# Patient Record
Sex: FEMALE | Race: WHITE | Employment: FULL TIME | URBAN - METROPOLITAN AREA
[De-identification: names, ages, dates, MRNs, and addresses within clinical notes are randomized per-mention and may not be internally consistent; named-entity substitution may affect disease eponyms.]

---

## 2024-08-21 ENCOUNTER — OFFICE VISIT (OUTPATIENT)
Dept: FAMILY MEDICINE | Facility: CLINIC | Age: 24
End: 2024-08-21
Payer: COMMERCIAL

## 2024-08-21 VITALS
TEMPERATURE: 100 F | OXYGEN SATURATION: 98 % | BODY MASS INDEX: 40.57 KG/M2 | DIASTOLIC BLOOD PRESSURE: 76 MMHG | SYSTOLIC BLOOD PRESSURE: 128 MMHG | HEIGHT: 67 IN | WEIGHT: 258.5 LBS | HEART RATE: 100 BPM

## 2024-08-21 DIAGNOSIS — J02.9 VIRAL PHARYNGITIS: Primary | ICD-10-CM

## 2024-08-21 DIAGNOSIS — J02.9 SORE THROAT: ICD-10-CM

## 2024-08-21 LAB
CTP QC/QA: YES
CTP QC/QA: YES
MOLECULAR STREP A: NEGATIVE
SARS-COV-2 RDRP RESP QL NAA+PROBE: NEGATIVE

## 2024-08-21 PROCEDURE — 87651 STREP A DNA AMP PROBE: CPT | Mod: QW,S$GLB,, | Performed by: STUDENT IN AN ORGANIZED HEALTH CARE EDUCATION/TRAINING PROGRAM

## 2024-08-21 PROCEDURE — 3074F SYST BP LT 130 MM HG: CPT | Mod: CPTII,S$GLB,, | Performed by: STUDENT IN AN ORGANIZED HEALTH CARE EDUCATION/TRAINING PROGRAM

## 2024-08-21 PROCEDURE — 87635 SARS-COV-2 COVID-19 AMP PRB: CPT | Mod: QW,S$GLB,, | Performed by: STUDENT IN AN ORGANIZED HEALTH CARE EDUCATION/TRAINING PROGRAM

## 2024-08-21 PROCEDURE — 99999 PR PBB SHADOW E&M-NEW PATIENT-LVL III: CPT | Mod: PBBFAC,,, | Performed by: STUDENT IN AN ORGANIZED HEALTH CARE EDUCATION/TRAINING PROGRAM

## 2024-08-21 PROCEDURE — 1159F MED LIST DOCD IN RCRD: CPT | Mod: CPTII,S$GLB,, | Performed by: STUDENT IN AN ORGANIZED HEALTH CARE EDUCATION/TRAINING PROGRAM

## 2024-08-21 PROCEDURE — 99213 OFFICE O/P EST LOW 20 MIN: CPT | Mod: S$GLB,,, | Performed by: STUDENT IN AN ORGANIZED HEALTH CARE EDUCATION/TRAINING PROGRAM

## 2024-08-21 PROCEDURE — 3008F BODY MASS INDEX DOCD: CPT | Mod: CPTII,S$GLB,, | Performed by: STUDENT IN AN ORGANIZED HEALTH CARE EDUCATION/TRAINING PROGRAM

## 2024-08-21 PROCEDURE — 3078F DIAST BP <80 MM HG: CPT | Mod: CPTII,S$GLB,, | Performed by: STUDENT IN AN ORGANIZED HEALTH CARE EDUCATION/TRAINING PROGRAM

## 2024-08-21 NOTE — PROGRESS NOTES
"Patient ID: Vy Uriostegui is a 24 y.o. female.    Chief Complaint: Sore Throat (Sore throat started yesterday)    History of Present Illness    CHIEF COMPLAINT:  Ms. Uriostegui presents today with sore throat.    HISTORY OF PRESENT ILLNESS:  She reports sore throat onset last night, initially significantly red but now improving. She experienced a mild fever of 99.5°F. She denies shortness of breath, wheezing, or history of recurrent tonsillitis, stating she rarely gets sick. She reports negative results for both COVID-19 and strep tests. She has not attempted any treatments, expressing a preference to avoid medication.    PHYSICAL EXAM:  Mild swelling and redness of the throat were observed, with possible presence of exudate and white dots.    ROS:  Constitutional: +fevers  ENT: +sore throat  Respiratory: -shortness of breath         Pmh, Psh, Family Hx, Social Hx updated in Epic Tabs today.     Physical exam    General - Well developed, alert and oriented in NAD  HEENT - normocephalic, no evidence of trauma, sclera white, EOMI  Neck - full range of motion  Throat exam normal. Oral cavity, tongue,  palate have no inflammation or suspicious lesions. Pharyngeal erythema +Tonsils - tonsils are present and normal. Teeth normal without tenderness.           Assessment:     1. Viral pharyngitis    2. Sore throat        LABS:   No results found for: "HGBA1C"   No results found for: "CHOL"  No results found for: "LDLCALC"  No results found for: "WBC", "HGB", "HCT", "PLT", "CHOL", "TRIG", "HDL", "LDLDIRECT", "ALT", "AST", "NA", "K", "CL", "CREATININE", "BUN", "CO2", "TSH", "PSA", "INR", "GLUF", "HGBA1C", "MICROALBUR"    Plan:   Vy was seen today for sore throat.    Diagnoses and all orders for this visit:    Viral pharyngitis    Sore throat  -     POCT COVID-19 Rapid Screening  -     POCT Strep A, Molecular        Assessment & Plan    J02.9 Acute pharyngitis, unspecified  J02.0 Streptococcal pharyngitis  VIRAL PHARYNGITIS:  - " Diagnosed viral pharyngitis based on negative COVID and strep tests, and clinical presentation.  - Noted mild swelling and redness of tonsils with possible exudate.  - Determined conservative management appropriate given current symptoms and absence of elevated-grade fever.  - Anticipated typical disease trajectory with potential worsening up to day 3-4, followed by gradual improvement.  - Considered potential need for antibiotics if patient develops elevated-grade fever or productive cough with colored sputum.  - Explained typical course of viral pharyngitis, including potential worsening in first 3-4 days followed by gradual improvement.  - Discussed that cough may linger after other symptoms resolve.  - Educated about the effectiveness of salt water gargles in soothing throat and killing bacteria/viruses.  - MsSandra Uriostegui to perform saline gargles with lukewarm water 3-4 times daily.  - Started OTC throat numbing spray as needed for sore throat.  - Started Mucinex as needed for nasal congestion.  FOLLOW UP:  - Contact the office if symptoms worsen, elevated-grade fever (101°F or higher) develops, or cough with brown or green-colored sputum occurs.  - Contact the office if not feeling better by day 7-10.           Face to Face time with patient:  4:00 PM CDT -      Each patient to whom he or she provides medical services by telemedicine is:  (1) informed of the relationship between the physician and patient and the respective role of any other health care provider with respect to management of the patient; and (2) notified that he or she may decline to receive medical services by telemedicine and may withdraw from such care at any time.    I spent a total of    25   minutes face to face and non-face to face on the date of this visit.This includes time preparing to see the patient (eg, review of tests, notes), obtaining and/or reviewing additional history from an independent historian and/or outside medical records,  documenting clinical information in the electronic health record, independently interpreting results and/or communicating results to the patient/family/caregiver, or care coordinator.  Visit today included increased complexity associated with the care of the episodic problem addressed and managing the longitudinal care of the patient due to the serious and/or complex managed problem(s).    There are no Patient Instructions on file for this visit.    No follow-ups on file.  The ASCVD Risk score (Valeri DK, et al., 2019) failed to calculate for the following reasons:    The 2019 ASCVD risk score is only valid for ages 40 to 79    This note was generated with the assistance of ambient listening technology. Verbal consent was obtained by the patient and accompanying visitor(s) for the recording of patient appointment to facilitate this note. I attest to having reviewed and edited the generated note for accuracy, though some syntax or spelling errors may persist. Please contact the author of this note for any clarification.